# Patient Record
Sex: FEMALE | Race: WHITE | NOT HISPANIC OR LATINO | URBAN - METROPOLITAN AREA
[De-identification: names, ages, dates, MRNs, and addresses within clinical notes are randomized per-mention and may not be internally consistent; named-entity substitution may affect disease eponyms.]

---

## 2019-09-12 NOTE — PATIENT DISCUSSION
The patient does have a retinal detachment which requires surgical intervention. I have discussed risks/benefits/alternatives to surgery including decreased vision, need for further treatment, increased eye pressure and cataract. I have consented the patient for possible scleral buckle, laser, gas, vitrectomy. I did review head positioning and travel restrictions if gas is used. I indicated there is a high chance we should be able to fix this with one surgery, a small chance the patient will need a second surgery and only about a 1% chance we wouldn’t be able to fix it even with multiple surgeries.

## 2019-09-12 NOTE — PATIENT DISCUSSION
Will plan for PPV 18% C3F8/EL. After discussion of risks, benefits, and alternatives, including loss of vision, blindness, need for additional surgery and/or retinal detachment, patient elects Retinal surgery.

## 2019-09-18 NOTE — PATIENT DISCUSSION
After discussion of risks, benefits, and alternatives, including loss of vision, blindness, need for additional surgery and/or retinal detachment, patient elects Retinal surgery.

## 2019-09-27 NOTE — PATIENT DISCUSSION
Post-op instructions given. Discussed drops, positioning, no strenuous activity, may stop eye protection. No heavy lifting. Call immediately if eye pain or loss of vision.  Patient to stop Moxi, continue Pred qid.

## 2019-11-01 NOTE — PATIENT DISCUSSION
Post-op instructions given. Discussed drops, positioning, no strenuous activity, may stop eye protection. No heavy lifting. Call immediately if eye pain or loss of vision.  Taper PRED TID/BID/QDAY x 2 week intervals.

## 2022-01-11 ENCOUNTER — NEW PATIENT (OUTPATIENT)
Dept: URBAN - METROPOLITAN AREA CLINIC 33 | Facility: CLINIC | Age: 62
End: 2022-01-11

## 2022-01-11 VITALS
WEIGHT: 160 LBS | SYSTOLIC BLOOD PRESSURE: 126 MMHG | DIASTOLIC BLOOD PRESSURE: 98 MMHG | HEIGHT: 65 IN | BODY MASS INDEX: 26.66 KG/M2 | HEART RATE: 69 BPM

## 2022-01-11 DIAGNOSIS — H43.12: ICD-10-CM

## 2022-01-11 DIAGNOSIS — H43.391: ICD-10-CM

## 2022-01-11 DIAGNOSIS — G89.11: ICD-10-CM

## 2022-01-11 DIAGNOSIS — H35.463: ICD-10-CM

## 2022-01-11 DIAGNOSIS — H43.392: ICD-10-CM

## 2022-01-11 PROCEDURE — 92134 CPTRZ OPH DX IMG PST SGM RTA: CPT

## 2022-01-11 PROCEDURE — 92004 COMPRE OPH EXAM NEW PT 1/>: CPT

## 2022-01-11 ASSESSMENT — TONOMETRY
OS_IOP_MMHG: 13
OD_IOP_MMHG: 14

## 2022-01-11 ASSESSMENT — VISUAL ACUITY
OD_PH: 20/20-2
OS_SC: 20/30
OD_SC: 20/30+1

## 2022-01-11 NOTE — PATIENT DISCUSSION
1/11/22: PT HAD A HEAD/L FOREHEAD TRAUMA ON 1/1/22. SX STARTED SOON AFTER. NO EVIDENCE OF DONESIS OR INFLAMMATION 2/2 TRAUMA. WE'LL CONT TO MONITOR FOR IMPROVEMENT OF VH.

## 2022-01-11 NOTE — PATIENT DISCUSSION
AS ABOVE. No retinal holes or tears seen on exam. Recommended OBSERVATION. We reviewed the signs and symptoms of retinal tear/retinal detachment and the importance of prompt evaluation should there be increasing floaters, new flashing lights, or decreasing peripheral vision in either eye at any time. Patient understands condition, prognosis and need for follow up care.

## 2022-01-11 NOTE — PATIENT DISCUSSION
1/11/22: Patient instructed to sleep WITH HEAD OF BED ELEVATED. NO EVIDENCE OF A TEAR TODAY. DISCUSSED THE IMPORTANCE OF POSITIONING AND F/U TO AVOID WORSENING OF VA.

## 2022-03-16 ENCOUNTER — FOLLOW UP (OUTPATIENT)
Dept: URBAN - METROPOLITAN AREA CLINIC 33 | Facility: CLINIC | Age: 62
End: 2022-03-16

## 2022-03-16 DIAGNOSIS — H43.812: ICD-10-CM

## 2022-03-16 DIAGNOSIS — G89.11: ICD-10-CM

## 2022-03-16 DIAGNOSIS — H43.391: ICD-10-CM

## 2022-03-16 DIAGNOSIS — H43.12: ICD-10-CM

## 2022-03-16 DIAGNOSIS — H35.463: ICD-10-CM

## 2022-03-16 PROCEDURE — 92201 OPSCPY EXTND RTA DRAW UNI/BI: CPT

## 2022-03-16 PROCEDURE — 92014 COMPRE OPH EXAM EST PT 1/>: CPT

## 2022-03-16 ASSESSMENT — VISUAL ACUITY
OD_SC: 20/40+1
OS_PH: 20/30+2
OS_SC: 20/30-2
OD_PH: 20/25-2

## 2022-03-16 ASSESSMENT — TONOMETRY
OD_IOP_MMHG: 12
OS_IOP_MMHG: 13